# Patient Record
Sex: MALE | Race: WHITE | Employment: FULL TIME | URBAN - METROPOLITAN AREA
[De-identification: names, ages, dates, MRNs, and addresses within clinical notes are randomized per-mention and may not be internally consistent; named-entity substitution may affect disease eponyms.]

---

## 2018-02-26 ENCOUNTER — OFFICE VISIT (OUTPATIENT)
Dept: FAMILY MEDICINE CLINIC | Age: 23
End: 2018-02-26

## 2018-02-26 VITALS
HEART RATE: 83 BPM | BODY MASS INDEX: 22.26 KG/M2 | SYSTOLIC BLOOD PRESSURE: 122 MMHG | TEMPERATURE: 97.9 F | WEIGHT: 179 LBS | HEIGHT: 75 IN | RESPIRATION RATE: 20 BRPM | OXYGEN SATURATION: 97 % | DIASTOLIC BLOOD PRESSURE: 82 MMHG

## 2018-02-26 DIAGNOSIS — Z00.00 ROUTINE GENERAL MEDICAL EXAMINATION AT A HEALTH CARE FACILITY: Primary | ICD-10-CM

## 2018-02-26 RX ORDER — IBUPROFEN 800 MG/1
800 TABLET ORAL
COMMUNITY
End: 2021-10-04

## 2018-02-26 RX ORDER — CYCLOBENZAPRINE HCL 10 MG
TABLET ORAL
COMMUNITY
End: 2021-10-04

## 2018-02-26 NOTE — MR AVS SNAPSHOT
[address] [address] 
[phone] Patient: Dhaval Faith MRN: [id number] Visit Information Date & Time Provider Department Dept. Phone Encounter #  
 2/26/2018  9:10 AM Brendan Alex MD [address] [facility] [phone] [id number] Follow-up Instructions Return if symptoms worsen or fail to improve. Upcoming Health Maintenance Date Due  
 HPV AGE 9Y-34Y (1 of 3 - Male 3 Dose Series) 1/31/2008 Influenza Age 5 to Adult 8/1/2017 DTaP/Tdap/Td series (1 - Tdap) 1/31/2018 Allergies as of 2/26/2018  Review Complete On: 2/26/2018 By: Brendan Alex MD  
 No Known Allergies Current Immunizations  Never Reviewed No immunizations on file. Not reviewed this visit You Were Diagnosed With   
  
 Codes Comments Routine general medical examination at a health care facility    -  Primary ICD-10-CM: Z00.00 ICD-9-CM: V70.0 Vitals BP Pulse Temp Resp Height(growth percentile) Weight(growth percentile) 122/82 83 97.9 °F (36.6 °C) 20 6' 3\" (1.905 m) 179 lb (81.2 kg) SpO2 BMI Smoking Status 97% 22.37 kg/m2 Never Smoker Vitals History BMI and BSA Data Body Mass Index Body Surface Area  
 22.37 kg/m 2 2.07 m 2 Preferred Pharmacy Pharmacy Name Phone [facility] [address] [place] [address] [phone] Your Updated Medication List  
  
   
This list is accurate as of 2/26/18  9:11 AM.  Always use your most recent med list.  
  
  
  
  
 cyclobenzaprine 10 mg tablet Commonly known as:  FLEXERIL Take  by mouth three (3) times daily as needed for Muscle Spasm(s). ibuprofen 800 mg tablet Commonly known as:  MOTRIN Take 800 mg by mouth every eight (8) hours as needed for Pain. We Performed the Following CBC WITH AUTOMATED DIFF [13968 CPT(R)] LIPID PANEL [66547 CPT(R)] METABOLIC PANEL, COMPREHENSIVE [24107 CPT(R)] TSH 3RD GENERATION [90751 CPT(R)] Follow-up Instructions Return if symptoms worsen or fail to improve. Introducing [facility]! Karolina Franco introduces [employer] patient portal. Now you can access parts of your medical record, email your doctor's office, and request medication refills online. 1. In your internet browser, go to [email or web address] 2. Click on the First Time User? Click Here link in the Sign In box. You will see the New Member Sign Up page. 3. Enter your [employer] Access Code exactly as it appears below. You will not need to use this code after youve completed the sign-up process. If you do not sign up before the expiration date, you must request a new code. · [employer] Access Code: [email or web address] Expires: 5/27/2018  9:11 AM 
 
4. Enter the last four digits of your Social Security Number (xxxx) and Date of Birth (mm/dd/yyyy) as indicated and click Submit. You will be taken to the next sign-up page. 5. Create a [employer] ID. This will be your [employer] login ID and cannot be changed, so think of one that is secure and easy to remember. 6. Create a [employer] password. You can change your password at any time. 7. Enter your Password Reset Question and Answer. This can be used at a later time if you forget your password. 8. Enter your e-mail address. You will receive e-mail notification when new information is available in [address] 9. Click Sign Up. You can now view and download portions of your medical record. 10. Click the Download Summary menu link to download a portable copy of your medical information. If you have questions, please visit the Frequently Asked Questions section of the [employer] website. Remember, [employer] is NOT to be used for urgent needs. For medical emergencies, dial 911. Now available from your iPhone and Android! Please provide this summary of care documentation to your next provider. If you have any questions after today's visit, please call [phone].

## 2018-02-26 NOTE — PROGRESS NOTES
New patient here to establish care. Meds, allergies, and past medical history reviewed with patient. Family history of htn and diabetes. Patient has a 3 day right groin injury that is currently on flexeril and ibuprofen. Pain still 6/ 10. Chief Complaint   Patient presents with    [facility]     new patient    Groin Injury     3 days ago hurt right groin at work      he is a 26y.o. year old male who presents for evalution. Reviewed PmHx, RxHx, FmHx, SocHx, AllgHx and updated and dated in the chart. There are no active problems to display for this patient. Review of Systems - negative except as listed above in the HPI    Objective:     Vitals:    02/26/18 0851   BP: 122/82   Pulse: 83   Resp: 20   Temp: 97.9 °F (36.6 °C)   SpO2: 97%   Weight: 179 lb (81.2 kg)   Height: 6' 3\" (1.905 m)     Physical Examination: General appearance - alert, well appearing, and in no distress  Eyes - pupils equal and reactive, extraocular eye movements intact  Ears - bilateral TM's and external ear canals normal  Nose - normal and patent, no erythema, discharge or polyps  Mouth - mucous membranes moist, pharynx normal without lesions  Neck - supple, no significant adenopathy  Chest - clear to auscultation, no wheezes, rales or rhonchi, symmetric air entry  Heart - normal rate, regular rhythm, normal S1, S2, no murmurs, rubs, clicks or gallops  Abdomen - soft, nontender, nondistended, no masses or organomegaly  Extremities - peripheral pulses normal, no pedal edema, no clubbing or cyanosis    Assessment/ Plan:   Diagnoses and all orders for this visit:    1.  Routine general medical examination at a health care facility  -     LIPID PANEL  -     METABOLIC PANEL, COMPREHENSIVE  -     CBC WITH AUTOMATED DIFF  -     TSH 3RD GENERATION       -Patient is in good health  -Discussed with patient cancer risk factors and screens needed  -Colonoscopy was recommended based on current guidelines for screening.  -Labs from previous visits were discussed with patient yes  -Discussed with patient diet and exercise  -Immunizations appropriate for age were discussed with pt and updated  -Follow-up Disposition:  Return if symptoms worsen or fail to improve. I have discussed the diagnosis with the patient and the intended plan as seen in the above orders. The patient understands and agrees with the plan. The patient has received an after-visit summary and questions were answered concerning future plans. Medication Side Effects and Warnings were discussed with patient  Patient Labs were reviewed and or requested  Patient Past Records were reviewed and or requested     There are no Patient Instructions on file for this visit.         Jaycee Martinez M.D.

## 2018-02-27 LAB
ALBUMIN SERPL-MCNC: 4.6 G/DL (ref 3.5–5.5)
ALBUMIN/GLOB SERPL: 1.8 {RATIO} (ref 1.2–2.2)
ALP SERPL-CCNC: 49 IU/L (ref 39–117)
ALT SERPL-CCNC: 15 IU/L (ref 0–44)
AST SERPL-CCNC: 17 IU/L (ref 0–40)
BASOPHILS # BLD AUTO: 0 X10E3/UL (ref 0–0.2)
BASOPHILS NFR BLD AUTO: 1 %
BILIRUB SERPL-MCNC: 0.7 MG/DL (ref 0–1.2)
BUN SERPL-MCNC: 12 MG/DL (ref 6–20)
BUN/CREAT SERPL: 13 (ref 9–20)
CALCIUM SERPL-MCNC: 9.6 MG/DL (ref 8.7–10.2)
CHLORIDE SERPL-SCNC: 99 MMOL/L (ref 96–106)
CHOLEST SERPL-MCNC: 152 MG/DL (ref 100–199)
CO2 SERPL-SCNC: 29 MMOL/L (ref 18–29)
CREAT SERPL-MCNC: 0.92 MG/DL (ref 0.76–1.27)
EOSINOPHIL # BLD AUTO: 0.2 X10E3/UL (ref 0–0.4)
EOSINOPHIL NFR BLD AUTO: 5 %
ERYTHROCYTE [DISTWIDTH] IN BLOOD BY AUTOMATED COUNT: 12.9 % (ref 12.3–15.4)
GFR SERPLBLD CREATININE-BSD FMLA CKD-EPI: 118 ML/MIN/1.73
GFR SERPLBLD CREATININE-BSD FMLA CKD-EPI: 137 ML/MIN/1.73
GLOBULIN SER CALC-MCNC: 2.6 G/DL (ref 1.5–4.5)
GLUCOSE SERPL-MCNC: 86 MG/DL (ref 65–99)
HCT VFR BLD AUTO: 40.1 % (ref 37.5–51)
HDLC SERPL-MCNC: 48 MG/DL
HGB BLD-MCNC: 14.1 G/DL (ref 13–17.7)
IMM GRANULOCYTES # BLD: 0 X10E3/UL (ref 0–0.1)
IMM GRANULOCYTES NFR BLD: 0 %
INTERPRETATION, 910389: NORMAL
LDLC SERPL CALC-MCNC: 77 MG/DL (ref 0–99)
LYMPHOCYTES # BLD AUTO: 1.3 X10E3/UL (ref 0.7–3.1)
LYMPHOCYTES NFR BLD AUTO: 31 %
MCH RBC QN AUTO: 29.3 PG (ref 26.6–33)
MCHC RBC AUTO-ENTMCNC: 35.2 G/DL (ref 31.5–35.7)
MCV RBC AUTO: 83 FL (ref 79–97)
MONOCYTES # BLD AUTO: 0.3 X10E3/UL (ref 0.1–0.9)
MONOCYTES NFR BLD AUTO: 8 %
NEUTROPHILS # BLD AUTO: 2.4 X10E3/UL (ref 1.4–7)
NEUTROPHILS NFR BLD AUTO: 55 %
PLATELET # BLD AUTO: 159 X10E3/UL (ref 150–379)
POTASSIUM SERPL-SCNC: 3.8 MMOL/L (ref 3.5–5.2)
PROT SERPL-MCNC: 7.2 G/DL (ref 6–8.5)
RBC # BLD AUTO: 4.81 X10E6/UL (ref 4.14–5.8)
SODIUM SERPL-SCNC: 141 MMOL/L (ref 134–144)
TRIGL SERPL-MCNC: 133 MG/DL (ref 0–149)
TSH SERPL DL<=0.005 MIU/L-ACNC: 1.86 UIU/ML (ref 0.45–4.5)
VLDLC SERPL CALC-MCNC: 27 MG/DL (ref 5–40)
WBC # BLD AUTO: 4.2 X10E3/UL (ref 3.4–10.8)

## 2018-02-27 NOTE — PROGRESS NOTES
After reviewing your labs, I believe they are within normal  limits for your age and let us stay with our current plan of action. In addition, you may receive a [employer] from our office. Thank you in advance for filling this out for us, and if you cannot   give a 10 on our ratings, please reach out to us and let us know  what we can do better for you! We strive for a ten, and while we   may not be perfect 100% of the time, we always try our best for  our patients! Ashley Caballero M.D.   Good Help to Those in Need

## 2021-10-04 ENCOUNTER — OFFICE VISIT (OUTPATIENT)
Dept: FAMILY MEDICINE CLINIC | Age: 26
End: 2021-10-04
Payer: COMMERCIAL

## 2021-10-04 VITALS
HEIGHT: 75 IN | WEIGHT: 260 LBS | TEMPERATURE: 97.4 F | SYSTOLIC BLOOD PRESSURE: 118 MMHG | DIASTOLIC BLOOD PRESSURE: 82 MMHG | HEART RATE: 83 BPM | RESPIRATION RATE: 16 BRPM | BODY MASS INDEX: 32.33 KG/M2

## 2021-10-04 DIAGNOSIS — R40.0 DAYTIME SOMNOLENCE: Primary | ICD-10-CM

## 2021-10-04 DIAGNOSIS — R53.83 FATIGUE, UNSPECIFIED TYPE: ICD-10-CM

## 2021-10-04 DIAGNOSIS — R63.0 LOSS OF APPETITE: ICD-10-CM

## 2021-10-04 PROCEDURE — 99204 OFFICE O/P NEW MOD 45 MIN: CPT | Performed by: NURSE PRACTITIONER

## 2021-10-04 NOTE — PATIENT INSTRUCTIONS
Sleep Apnea: Care Instructions  Overview     Sleep apnea means that you frequently stop breathing for 10 seconds or longer during sleep. It can be mild to severe, based on the number of times an hour that you stop breathing. Blocked or narrowed airways in your nose, mouth, or throat can cause sleep apnea. Your airway can become blocked when your throat muscles and tongue relax during sleep. You can help treat sleep apnea at home by making lifestyle changes. You also can use a CPAP breathing machine that keeps tissues in the throat from blocking your airway. Or your doctor may suggest that you use a breathing device while you sleep. It helps keep your airway open. This could be a device that you put in your mouth. In some cases, surgery may be needed to remove enlarged tissues in the throat. Follow-up care is a key part of your treatment and safety. Be sure to make and go to all appointments, and call your doctor if you are having problems. It's also a good idea to know your test results and keep a list of the medicines you take. How can you care for yourself at home? · Lose weight, if needed. · Sleep on your side. It may help mild apnea. · Avoid alcohol and medicines such as sleeping pills, opioids, or sedatives before bed. · Don't smoke. If you need help quitting, talk to your doctor. · Prop up the head of your bed. · Treat breathing problems, such as a stuffy nose, that are caused by a cold or allergies. · Try a continuous positive airway pressure (CPAP) breathing machine if your doctor recommends it. · If CPAP doesn't work for you, ask your doctor if you can try other masks, settings, or breathing machines. · Try oral breathing devices or other nasal devices. · Talk to your doctor if your nose feels dry or bleeds, or if it gets runny or stuffy when you use a breathing machine. · Tell your doctor if you're sleepy during the day and it affects your daily life.  Don't drive or operate machinery when you're drowsy. When should you call for help? Watch closely for changes in your health, and be sure to contact your doctor if:    · You still have sleep apnea even though you have made lifestyle changes.     · You are thinking of trying a device such as CPAP.     · You are having problems using a CPAP or similar machine.     · You are still sleepy during the day, and it affects your daily life. Where can you learn more? Go to http://www.gray.com/  Enter J936 in the search box to learn more about \"Sleep Apnea: Care Instructions. \"  Current as of: July 6, 2021               Content Version: 13.0  © 4273-6484 [employer]. Care instructions adapted under license by [employer] (which disclaims liability or warranty for this information). If you have questions about a medical condition or this instruction, always ask your healthcare professional. [address] [address] any warranty or liability for your use of this information.

## 2021-10-04 NOTE — PROGRESS NOTES
1. Have you been to the ER, urgent care clinic since your last visit? Hospitalized since your last visit? Yes When: 2 weeks ago patient went to [facility] and a few days later he went to [facility] for dehyadration. Felt like he may have had a panic attack     2. Have you seen or consulted any other health care providers outside of the [address] [place] [address] since your last visit? Include any pap smears or colon screening.  Yes When: 2 weeks ago went to [facility].    Visit Vitals  /82 (BP 1 Location: Left upper arm, BP Patient Position: Sitting)   Pulse 83   Temp 97.4 °F (36.3 °C) (Axillary)   Resp 16   Ht 6' 3\" (1.905 m)   Wt 260 lb (117.9 kg)   BMI 32.50 kg/m²     Chief Complaint   Patient presents with    Anxiety    Fatigue

## 2021-10-04 NOTE — PROGRESS NOTES
He presents new evaluation of fatigue, daytime somnolence. On 22nd while at work, he doesn't normally work around people, his heart started racing being around people, had racing heart. Was seen at [facility] and [facility], on the 23rd [facility] neg [facility], he went for extreme lethargy. He [facility] 267lbs at [facility].  On Friday night, he went to [employer] and was advised he was dehydrated, went back on Saturday, did ekg, covid testing all normal, labs otherwise normal.  He lost 10 lbs from Thursday to Saturday. He hasn't been back to work since Wednesday the 22nd. The ER physician advised was probably a panic attack. He sleeps 8 hours a night, but wakes up feeling like he doesn't have any energy, and he feels he hasn't slept at all. He feels it in his eyes. This isn't normal for him, and he feels he can't get off the couch. He is eating less, and feels his stomach. 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 He complains of weight loss and fatigue. He denies current suicidal and homicidal plan or intent. Family history significant for anxiety. Possible organic causes contributing are: none. Risk factors: none apparent. Previous drug treatment includes none; other therapy: no other therapies. Current treatment includes none and none. Lives in an apt with his gf and his brother, they both were sick 2-3 days ago, they both were vomiting, today they are both feeling fine. He notes his fatigue is better today. Depression ROS:  He denies depressed mood, anhedonia, hypersomnia, psychomotor agitation, psychomotor retardation, feelings of worthlessness/guilt, impaired memory, recurrent thoughts of death, suicidal thoughts without plan and suicidal thoughts with specific plan. He experiences the following side effects from the treatment: none. Review of Systems   Constitutional: Positive for malaise/fatigue and weight loss. Negative for chills, diaphoresis and fever. Respiratory: Positive for cough (dry cough mostly resolved). Gastrointestinal: Negative for abdominal pain, heartburn, nausea and vomiting. Neurological: Positive for weakness. Negative for dizziness, tingling, tremors, sensory change, speech change, seizures, loss of consciousness and headaches. Psychiatric/Behavioral: Negative for depression, hallucinations, memory loss, substance abuse and suicidal ideas. The patient does not have insomnia. Objective  Visit Vitals  /82 (BP 1 Location: Left upper arm, BP Patient Position: Sitting)   Pulse 83   Temp 97.4 °F (36.3 °C) (Axillary)   Resp 16   Ht 6' 3\" (1.905 m)   Wt 260 lb (117.9 kg)   BMI 32.50 kg/m²     Physical Exam  Constitutional:       Appearance: Normal appearance. He is normal weight. HENT:      Head: Normocephalic and atraumatic. Eyes:      Extraocular Movements: Extraocular movements intact. Pupils: Pupils are equal, round, and reactive to light. Cardiovascular:      Rate and Rhythm: Normal rate and regular rhythm. Pulses: Normal pulses. Pulmonary:      Effort: Pulmonary effort is normal.   Musculoskeletal:         General: Normal range of motion. Cervical back: Normal range of motion. Neurological:      General: No focal deficit present. Mental Status: He is alert and oriented to person, place, and time. Cranial Nerves: Cranial nerves are intact. No cranial nerve deficit or facial asymmetry. Sensory: Sensation is intact. Motor: Motor function is intact. No weakness, tremor or pronator drift. Coordination: Coordination is intact. Romberg sign negative. Coordination normal. Finger-Nose-Finger Test and Heel to Kay Heir Test normal. Rapid alternating movements normal.      Gait: Gait is intact.  Gait and tandem walk normal.      Deep Tendon Reflexes: Reflexes normal.      Reflex Scores:       Patellar reflexes are 2+ on the right side and 2+ on the left side. Psychiatric:         Mood and Affect: Mood normal.         Behavior: Behavior normal.          Score of 2 KIMBERLY-7  Score of 9 PHQ     Assessment & Plan    ICD-10-CM ICD-9-CM    1. Daytime somnolence  R40.0 780.54 REFERRAL TO SLEEP STUDIES   2. Fatigue, unspecified type  R53.83 780.79 REFERRAL TO SLEEP STUDIES   3. Loss of appetite  R63.0 783.0      Diagnoses and all orders for this visit:    1. Daytime somnolence  -     REFERRAL TO SLEEP STUDIES    2. Fatigue, unspecified type  -     REFERRAL TO SLEEP STUDIES    3. 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 But doesn't feel this issues relates to sleep or depression, and I am no inclined to treat for depression due to acute presentation. Working diagnosis is post viral, advised watchful waiting and a f/u, schedule sleep study can cancel if symptoms resolve. Requested labs from [facility] to r/o YAKELIN. Follow-up and Dispositions    · Return in about 6 weeks (around 11/15/2021) for joce.        Tod Morton NP

## 2021-10-14 ENCOUNTER — TELEPHONE (OUTPATIENT)
Dept: SLEEP MEDICINE | Age: 26
End: 2021-10-14

## 2021-11-24 ENCOUNTER — OFFICE VISIT (OUTPATIENT)
Dept: FAMILY MEDICINE CLINIC | Age: 26
End: 2021-11-24
Payer: COMMERCIAL

## 2021-11-24 VITALS
WEIGHT: 260 LBS | HEIGHT: 75 IN | BODY MASS INDEX: 32.33 KG/M2 | RESPIRATION RATE: 14 BRPM | DIASTOLIC BLOOD PRESSURE: 86 MMHG | TEMPERATURE: 98 F | SYSTOLIC BLOOD PRESSURE: 131 MMHG | HEART RATE: 79 BPM | OXYGEN SATURATION: 98 %

## 2021-11-24 DIAGNOSIS — Z28.21 TETANUS, DIPHTHERIA, AND ACELLULAR PERTUSSIS (TDAP) VACCINATION DECLINED: ICD-10-CM

## 2021-11-24 DIAGNOSIS — Z00.00 WELLNESS EXAMINATION: Primary | ICD-10-CM

## 2021-11-24 DIAGNOSIS — Z11.59 ENCOUNTER FOR HEPATITIS C SCREENING TEST FOR LOW RISK PATIENT: ICD-10-CM

## 2021-11-24 PROCEDURE — 99395 PREV VISIT EST AGE 18-39: CPT | Performed by: NURSE PRACTITIONER

## 2021-11-24 NOTE — PROGRESS NOTES
Previous HPI:  He presents new evaluation of fatigue, daytime somnolence. On 22nd while at work, he doesn't normally work around people, his heart started racing being around people, had racing heart. Was seen at [facility], on the 23rd pt went [facility] had neg covid test, he went for extreme lethargy. He was 267lbs at [facility].  On Friday night, he went to [employer] and was advised he was dehydrated, went back on Saturday, did ekg, covid testing all normal, labs otherwise normal.  He lost 10 lbs from Thursday to Saturday. He hasn't been back to work since Wednesday the 22nd. The ER physician advised was probably a panic attack. He sleeps 8 hours a night, but wakes up feeling like he doesn't have any energy, and he feels he hasn't slept at all. He feels it in his eyes. This isn't normal for him, and he feels he can't get off the couch. He is eating less, and feels his stomach. 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 He complains of weight loss and fatigue. He denies current suicidal and homicidal plan or intent. Family history significant for anxiety. Possible organic causes contributing are: none. Risk factors: none apparent. Previous drug treatment includes none; other therapy: no other therapies. Current treatment includes none and none. Lives in an apt with his gf and his brother, they both were sick 2-3 days ago, they both were vomiting, today they are both feeling fine. He notes his fatigue is better today. Update 11/24/21:  Tyrell Duong is on the schedule today for annual wellness exam and may also be due for follow-up of chronic/acute issues.  Pt is willing to do both appointments today and realizes that there may be a co-pay for the follow-up portion of the visit. For the wellness:  Flu vaccine- Recommended every fall  COVID vaccine series- he is contemplating  Tetanus- Tdap   Shingrix- series not completed  Pneumovax 23- N/A  Prevnar 13- at age 74  HCV screening- complete  PSA-na  Colon cancer screening- na  AAA screening- N/A  Low dose CT scan- N/A  PHQ2 Score = 0  Occupation - [employer]   Exercise- physically active at work, runs around the house with the baby. Doesn't have the drive to go to a gym because so tired after working  Smoking status- never  ETOH - occasionally, liquor,maybe 2 drinks, 1-2x a week. Diet - \"the most unhealthy it can possibly be\". occ fruits and veg. He wants to buy a juicer and get that going. But he plans to start eating healthier. He eats fast food everyday for lunch, usually a sandwich. In 2018 he was really skinny. Then he started making money and bulking up at his job. His daughter is now 6 months old  Past Medical History:   Diagnosis Date    Osgood-Schlatter's disease     bilat knees      No current outpatient medications   For the acute/chronic issues:  Fatigue is improving. Didn't go for sleep study  Review of Systems   All other systems reviewed and are negative. Vitals:    11/24/21 0758   BP: 131/86   Pulse: 79   Resp: 14   Temp: 98 °F (36.7 °C)   TempSrc: Axillary   SpO2: 98%   Weight: 260 lb (117.9 kg)   Height: 6' 3\" (1.905 m)      Physical Exam  Constitutional:       Appearance: Normal appearance. HENT:      Head: Normocephalic and atraumatic. Right Ear: External ear normal.      Left Ear: External ear normal.      Nose: Nose normal.   Eyes:      Extraocular Movements: Extraocular movements intact. Conjunctiva/sclera: Conjunctivae normal.      Pupils: Pupils are equal, round, and reactive to light. Cardiovascular:      Rate and Rhythm: Normal rate and regular rhythm. Pulses: Normal pulses. Heart sounds: Normal heart sounds.    Pulmonary: Effort: Pulmonary effort is normal.      Breath sounds: Normal breath sounds. Abdominal:      General: Bowel sounds are normal.      Palpations: Abdomen is soft. Musculoskeletal:         General: Normal range of motion. Cervical back: Normal range of motion and neck supple. Skin:     General: Skin is warm and dry. Neurological:      General: No focal deficit present. Mental Status: He is alert and oriented to person, place, and time. Psychiatric:         Mood and Affect: Mood normal.         Behavior: Behavior normal.         Thought Content: Thought content normal.         Judgment: Judgment normal.         KIMBERLY 2/7 11/24/2021   Feeling nervous, anxious, or on edge 3   Not being able to stop or control worrying 1   Worrying too much about different things 0   Trouble relaxing 0   Being so restless that it is hard to sit still 0   Becoming easily annoyed or irritable 1   Feeling afraid as if something awful might happen 0   KIMBERLY-7 Total Score 5        Lab Results   Component Value Date/Time    WBC 4.2 02/26/2018 09:12 AM    HGB 14.1 02/26/2018 09:12 AM    HCT 40.1 02/26/2018 09:12 AM    PLATELET 022 82/49/6599 09:12 AM    MCV 83 02/26/2018 09:12 AM     Lab Results   Component Value Date/Time    Glucose 86 02/26/2018 09:12 AM    LDL, calculated 77 02/26/2018 09:12 AM    Creatinine 0.92 02/26/2018 09:12 AM      Lab Results   Component Value Date/Time    Sodium 141 02/26/2018 09:12 AM    Potassium 3.8 02/26/2018 09:12 AM    Chloride 99 02/26/2018 09:12 AM    CO2 29 02/26/2018 09:12 AM    Glucose 86 02/26/2018 09:12 AM    BUN 12 02/26/2018 09:12 AM    Creatinine 0.92 02/26/2018 09:12 AM    BUN/Creatinine ratio 13 02/26/2018 09:12 AM    GFR est  02/26/2018 09:12 AM    GFR est non- 02/26/2018 09:12 AM    Calcium 9.6 02/26/2018 09:12 AM    Bilirubin, total 0.7 02/26/2018 09:12 AM    ALT (SGPT) 15 02/26/2018 09:12 AM    Alk.  phosphatase 49 02/26/2018 09:12 AM    Protein, total 7.2 02/26/2018 09:12 AM    Albumin 4.6 02/26/2018 09:12 AM    A-G Ratio 1.8 02/26/2018 09:12 AM          Diagnoses and all orders for this visit:    1. Wellness examination  -     LIPID PANEL  -     METABOLIC PANEL, COMPREHENSIVE  -     CBC WITH AUTOMATED DIFF  At today's annual wellness visit, the patient and I reviewed. - Vital signs: to include temperature, blood pressure, heart rate, and respiration rate create a baseline for overall wellness. - Current vaccine status: Depending on age, recommendations were made for new vaccine or a booster shot.  - Recommended screenings: to include blood test, colonoscopy or bone density screening as appropriate for the pts age and gender  - We discussed new prevention and treatment recommendations, and any new health changes. - Provided guidance on reducing risk for disease. As applicable provided motivation to stop smoking, lose unhealthy pounds, or manage risk factors like high blood pressure.  - Reviewed medications. To include prescriptions, over-the-counter drugs, and supplements; and reviewed risk for interactions. 2. BMI 32.0-32.9,adult  Encouraged/praised patient weight loss efforts, encouraged healthy weight loss of one to 2 pounds a week, continue exercise, continue low-carb, a diet high in fresh vegetables and fruits low in processed/fast foods, hydrate with water and avoid sugary beverages. Additionally, filter tap water with tap attachment or water pitcher, avoid using plastic bottles, and not to heat food in plastic/Styrofoam containers. Avoid unnecessary antibiotic prescriptions, noting abx use is correlated with wt gain; and used in livestock for weight gain. Encouraged patient to increase fiber intake, can supplement with over-the-counter psyllium fiber. Pt declines pharmacological intervention at this time, advised to follow up with PCP should change mind  3. Tetanus, diphtheria, and acellular pertussis (Tdap) vaccination declined    4.  Encounter for hepatitis C screening test for low risk patient  -     HCV AB W/RFLX TO EDDIE  For adults aged 25 to 78 years: Grade B to Screen adults for hepatitis C virus (HCV) infection. Asymptomatic adults (including pregnant persons) aged 25 to 78 years.

## 2021-11-24 NOTE — PROGRESS NOTES
Chief Complaint   Patient presents with    Follow-up     Visit Vitals  /86 (BP 1 Location: Left upper arm, BP Patient Position: Sitting)   Pulse 79   Temp 98 °F (36.7 °C) (Axillary)   Resp 14   Ht 6' 3\" (1.905 m)   Wt 260 lb (117.9 kg)   SpO2 98%   BMI 32.50 kg/m²     1. Have you been to the ER, urgent care clinic since your last visit? Hospitalized since your last visit? No    2. Have you seen or consulted any other health care providers outside of the [address] [place] [address] since your last visit? Include any pap smears or colon screening.  No

## 2021-11-25 LAB
ALBUMIN SERPL-MCNC: 5 G/DL (ref 4.1–5.2)
ALBUMIN/GLOB SERPL: 2.2 {RATIO} (ref 1.2–2.2)
ALP SERPL-CCNC: 66 IU/L (ref 44–121)
ALT SERPL-CCNC: 24 IU/L (ref 0–44)
AST SERPL-CCNC: 17 IU/L (ref 0–40)
BASOPHILS # BLD AUTO: 0.1 X10E3/UL (ref 0–0.2)
BASOPHILS NFR BLD AUTO: 1 %
BILIRUB SERPL-MCNC: 0.9 MG/DL (ref 0–1.2)
BUN SERPL-MCNC: 13 MG/DL (ref 6–20)
BUN/CREAT SERPL: 13 (ref 9–20)
CALCIUM SERPL-MCNC: 10 MG/DL (ref 8.7–10.2)
CHLORIDE SERPL-SCNC: 101 MMOL/L (ref 96–106)
CHOLEST SERPL-MCNC: 193 MG/DL (ref 100–199)
CO2 SERPL-SCNC: 28 MMOL/L (ref 20–29)
CREAT SERPL-MCNC: 1 MG/DL (ref 0.76–1.27)
EOSINOPHIL # BLD AUTO: 0.3 X10E3/UL (ref 0–0.4)
EOSINOPHIL NFR BLD AUTO: 6 %
ERYTHROCYTE [DISTWIDTH] IN BLOOD BY AUTOMATED COUNT: 11.6 % (ref 11.6–15.4)
GLOBULIN SER CALC-MCNC: 2.3 G/DL (ref 1.5–4.5)
GLUCOSE SERPL-MCNC: 96 MG/DL (ref 65–99)
HCT VFR BLD AUTO: 44.9 % (ref 37.5–51)
HCV AB S/CO SERPL IA: <0.1 S/CO RATIO (ref 0–0.9)
HCV AB SERPL QL IA: NORMAL
HDLC SERPL-MCNC: 44 MG/DL
HGB BLD-MCNC: 15.3 G/DL (ref 13–17.7)
IMM GRANULOCYTES # BLD AUTO: 0 X10E3/UL (ref 0–0.1)
IMM GRANULOCYTES NFR BLD AUTO: 0 %
LDLC SERPL CALC-MCNC: 126 MG/DL (ref 0–99)
LYMPHOCYTES # BLD AUTO: 1.7 X10E3/UL (ref 0.7–3.1)
LYMPHOCYTES NFR BLD AUTO: 29 %
MCH RBC QN AUTO: 29.5 PG (ref 26.6–33)
MCHC RBC AUTO-ENTMCNC: 34.1 G/DL (ref 31.5–35.7)
MCV RBC AUTO: 87 FL (ref 79–97)
MONOCYTES # BLD AUTO: 0.5 X10E3/UL (ref 0.1–0.9)
MONOCYTES NFR BLD AUTO: 9 %
NEUTROPHILS # BLD AUTO: 3.3 X10E3/UL (ref 1.4–7)
NEUTROPHILS NFR BLD AUTO: 55 %
PLATELET # BLD AUTO: 213 X10E3/UL (ref 150–450)
POTASSIUM SERPL-SCNC: 4.4 MMOL/L (ref 3.5–5.2)
PROT SERPL-MCNC: 7.3 G/DL (ref 6–8.5)
RBC # BLD AUTO: 5.18 X10E6/UL (ref 4.14–5.8)
SODIUM SERPL-SCNC: 142 MMOL/L (ref 134–144)
TRIGL SERPL-MCNC: 126 MG/DL (ref 0–149)
VLDLC SERPL CALC-MCNC: 23 MG/DL (ref 5–40)
WBC # BLD AUTO: 6 X10E3/UL (ref 3.4–10.8)

## 2021-11-26 NOTE — PROGRESS NOTES
lab tests show LDL cholesterol also known as the bad cholesterol is elevated. Often times elevated cholesterol is genetic but there are simple ways to counteract elevated cholesterol. In lieu of or in addition to a cholesterol-lowering medication, ways to naturally reduce your cholesterol and heart attack/stroke risk; include weight loss, increased physical activity (walking, stationary bike, rowing), if you smoke-quitting, if you are a diabetic controlling your blood sugar, increasing your intake of fiber and/or supplementing with psyllium fiber (Metamucil or generic product), eating nuts, (particularly walnuts, almonds, pistachios, macadamia nuts, pecans, and hazelnuts), whey protein, and whole grains.      Other effective Lifestyle Changes:  ·Mediterranean diet  ·Dietary Approaches to Stop Hypertension (DASH) diet  ·Vegetarian (or other meat restricted) diet  ·Low-carbohydrate diet  ·Avoidance of trans fats

## 2022-02-23 ENCOUNTER — TELEPHONE (OUTPATIENT)
Dept: INTERNAL MEDICINE CLINIC | Age: 27
End: 2022-02-23

## 2022-02-23 NOTE — TELEPHONE ENCOUNTER
----- Message from Rosalinda Booker sent at 2/23/2022  7:08 AM EST -----  Subject: Appointment Request    Reason for Call: Semi-Routine Cough, Cold Symptoms    QUESTIONS  Type of Appointment? Established Patient  Reason for appointment request? No appointments available during search  Additional Information for Provider? Pt called to schedule a appointment   for a possible sinus infection. Please return his call to schedule.   ---------------------------------------------------------------------------  --------------  CALL BACK INFO  What is the best way for the office to contact you? OK to leave message on   voicemail  Preferred Call Back Phone Number? [phone]  ---------------------------------------------------------------------------  --------------  SCRIPT ANSWERS  Relationship to Patient? Self  Are you currently unable to finish sentences due to any difficulty   breathing? No  Are you unable to swallow liquids? No  Are you having fevers (100.4 or greater), chills, or sweats? No  Do you have COPD, asthma or a chronic lung condition? No  Have your symptoms been present for more than 5 days? No  Have you recently (14 days) been seen by a provider for this issue? No  Have you been diagnosed with, awaiting test results for, or told that you   are suspected of having COVID-19 (Coronavirus)? (If patient has tested   negative or was tested as a requirement for work, school, or travel and   not based on symptoms, answer no)? Yes  Did your symptoms begin within the past 10 days or was your positive test   result within the past 10 days? No  Within the past 10 days have you developed any of the following symptoms   (answer no if symptoms have been present longer than 10 days or began   more than 10 days ago)?  Fever or Chills, Cough, Shortness of breath or   difficulty breathing, Loss of taste or smell, Sore throat, Nasal   congestion, Sneezing or runny nose, Fatigue or generalized body aches   (answer no if pain is specific to a body part e.g. back pain), Diarrhea,   Headache? No  Have you had close contact with someone with COVID-19 in the last 7 days? No  (Service Expert  click yes below to proceed with [place] As Usual   Scheduling)?  Yes

## 2023-03-17 ENCOUNTER — TELEPHONE (OUTPATIENT)
Dept: FAMILY MEDICINE CLINIC | Age: 28
End: 2023-03-17

## 2023-03-17 NOTE — TELEPHONE ENCOUNTER
Find out how much Imodium he is taking? If he continues having diarrhea get increase how much Imodium he is taking.

## 2023-03-17 NOTE — TELEPHONE ENCOUNTER
Pt stated that he has been having nausea and diarrhea for the past 3 days. Wants to know what else he can take because the Pepto and imodium are not working.

## 2023-03-17 NOTE — TELEPHONE ENCOUNTER
Patient would like to know what can he do for a stomach bug that is going around he has taken [address], Immodium AD and he has stomach cramps as well please call pateint back at [phone]

## 2023-03-17 NOTE — TELEPHONE ENCOUNTER
Patient has only taken 3 doses of imodium, reviewed directions of imodium which he is going to start doing.

## 2023-04-15 PROBLEM — E78.00 PURE HYPERCHOLESTEROLEMIA: Status: ACTIVE | Noted: 2023-04-15

## 2024-01-27 NOTE — PATIENT INSTRUCTIONS
Consults    Reason For Consult  Medical management    History Of Present Illness  Irish Davis is a 82 y.o. female with PMHx s/f HTN, HLD, moderate aortic stenosis, chronic bilateral lower extremity edema (on as needed Lasix), COPD with chronic hypoxic respiratory failure (baseline 3-4 L nasal cannula) who presented to the emergency room after a fall at home with bilateral distal radius fracture.  On admission in the emergency room she was hemodynamically stable with a blood pressure of 163/83 mmHg, heart rate of 72/min, respirate rate of 18/min, temperature of 36.8 °C and was saturating 94% on her baseline 4 L nasal cannula oxygen.  Initial CBC showed a hemoglobin of 10.6 BMP were significant for a potassium of 3.4.  Influenza antigen and COVID PCR were negative.  CT of the head was negative for any acute intracranial process, CT cervical spine showed loss of normal cervical lordosis with demineralization in addition to some healing or healed fracture of the C1 vertebrae.  X-ray of the bilateral wrist and hand showed complex distal radius and ulna fracture with impaction and dorsal angulation and displacement on the left and dorsally angulated distal radius fracture and ulnar styloid fracture on the right.  General medicine has been consulted for medical management.  I saw patient on the regular nursing floor..  She was drowsy and barely arousable.  Daughter and  at bedside confirming initial  history prior to admission.  Past Medical History  She has a past medical history of COPD (chronic obstructive pulmonary disease) (CMS/[facility]), Disorder of the skin and subcutaneous tissue, unspecified (07/22/2016), Personal history of other diseases of the respiratory system (02/06/2018), and Personal history of other specified conditions (07/22/2016).    Surgical History  She has a past surgical history that includes Other surgical history (09/25/2019).     Social History  She reports that she has quit smoking.  Well Visit, Ages 25 to 48: Care Instructions  Overview     Well visits can help you stay healthy. Your doctor has checked your overall health and may have suggested ways to take good care of yourself. Your doctor also may have recommended tests. At home, you can help prevent illness with healthy eating, regular exercise, and other steps. Follow-up care is a key part of your treatment and safety. Be sure to make and go to all appointments, and call your doctor if you are having problems. It's also a good idea to know your test results and keep a list of the medicines you take. How can you care for yourself at home? · Get screening tests that you and your doctor decide on. Screening helps find diseases before any symptoms appear. · Eat healthy foods. Choose fruits, vegetables, whole grains, protein, and low-fat dairy foods. Limit fat, especially saturated fat. Reduce salt in your diet. · Limit alcohol. If you are a man, have no more than 2 drinks a day or 14 drinks a week. If you are a woman, have no more than 1 drink a day or 7 drinks a week. · Get at least 30 minutes of physical activity on most days of the week. Walking is a good choice. You also may want to do other activities, such as running, swimming, cycling, or playing tennis or team sports. Discuss any changes in your exercise program with your doctor. · Reach and stay at a healthy weight. This will lower your risk for many problems, such as obesity, diabetes, heart disease, and high blood pressure. · Do not smoke or allow others to smoke around you. If you need help quitting, talk to your doctor about stop-smoking programs and medicines. These can increase your chances of quitting for good. · Care for your mental health. It is easy to get weighed down by worry and stress. Learn strategies to manage stress, like deep breathing and mindfulness, and stay connected with your family and community.  If you find you often feel sad or hopeless, talk Her smoking use included cigarettes. She does not have any smokeless tobacco history on file. She reports that she does not drink alcohol and does not use drugs.    Family History  No family history on file.     Allergies  Erythromycin    Review of Systems    Unable to complete review of systems due to patient's drowsiness  Physical Exam  Constitutional:       General: Acute on chronically ill looking elderly  female; dry mucous membrane, drowsy and barely arousable afebrile anicteric mildly pale       HENT:      Head: Normocephalic and atraumatic.      Right Ear: External ear normal.      Left Ear: External ear normal.      Nose: Nose normal.      Mouth/Throat:      Mouth: Mucous membranes are dry     Pharynx: Oropharynx is clear.   Eyes:      Extraocular Movements: Extraocular movements intact.      Conjunctiva/sclera: Conjunctivae normal.      Pupils: Pupils are equal, round, and reactive to light.   Cardiovascular:      Rate and Rhythm: Normal rate and regular rhythm.      Pulses: Normal pulses.      Heart sounds: Normal heart sounds.   Pulmonary:      Effort: Pulmonary effort is normal. No respiratory distress.      Breath sounds: Wheezing and rales present. No rhonchi.      Comments: NC in place  Abdominal:      General: Abdomen is flat. Bowel sounds are normal.      Palpations: Abdomen is soft.      Tenderness: There is no abdominal tenderness. There is no right CVA tenderness, left CVA tenderness, guarding or rebound.   Musculoskeletal:      Bilateral ACE bandaging of the upper extremity with splinting  Skin:     General: Skin is warm and dry.      Capillary Refill: Capillary refill takes less than 2 seconds.      Findings: No lesion or rash.   Neurological:      General: No focal deficit present.      Mental Status: She is drowsy and barely arousable,     Last Recorded Vitals  /65   Pulse 63   Temp 36.7 °C (98 °F) (Tympanic)   Resp 17   Wt 54.4 kg (120 lb)   SpO2 98%     Relevant  with your doctor. Treatment can help. · Talk to your doctor about whether you have any risk factors for sexually transmitted infections (STIs). You can help prevent STIs if you wait to have sex with a new partner (or partners) until you've each been tested for STIs. It also helps if you use condoms (male or female condoms) and if you limit your sex partners to one person who only has sex with you. Vaccines are available for some STIs, such as HPV. · Use birth control if it's important to you to prevent pregnancy. Talk with your doctor about the choices available and what might be best for you. · If you think you may have a problem with alcohol or drug use, talk to your doctor. This includes prescription medicines (such as amphetamines and opioids) and illegal drugs (such as cocaine and methamphetamine). Your doctor can help you figure out what type of treatment is best for you. · Protect your skin from too much sun. When you're outdoors from 10 a.m. to 4 p.m., stay in the shade or cover up with clothing and a hat with a wide brim. Wear sunglasses that block UV rays. Even when it's cloudy, put broad-spectrum sunscreen (SPF 30 or higher) on any exposed skin. · See a dentist one or two times a year for checkups and to have your teeth cleaned. · Wear a seat belt in the car. When should you call for help? Watch closely for changes in your health, and be sure to contact your doctor if you have any problems or symptoms that concern you. Where can you learn more? Go to http://www.[employer].com/  Enter P072 in the search box to learn more about \"Well Visit, Ages 25 to 48: Care Instructions. \"  Current as of: February 11, 2021               Content Version: 13.0  © 9873-3567 [employer]. Care instructions adapted under license by [employer] (which disclaims liability or warranty for this information).  If you have questions about a medical condition or this instruction, Results  XR hand right 3+ views    Result Date: 1/26/2024  Interpreted By:  Festus Schumacher, STUDY: XR HAND RIGHT 3+ VIEWS; XR WRIST RIGHT 3+ VIEWS; ;  1/26/2024 9:10 pm   INDICATION: Signs/Symptoms:fall.   COMPARISON: None.   ACCESSION NUMBER(S): [id number]; [id number]   ORDERING CLINICIAN: FESTUS BERMEO   FINDINGS: Evaluation of the right hand and wrist. No evidence of metacarpal fracture.   Dorsally angulated distal radius fracture and probable ulnar styloid fracture. Fracture is less displaced than on the left. The distal radius is angulated dorsally proximally 50 degrees. Resultant ulnar positive variance.         Dorsally angulated and mildly displaced distal radius fracture. Ulnar positive variance. Minimal displaced ulnar styloid fracture.   MACRO: None   Signed by: Festus Schumacher 1/26/2024 9:58 PM Dictation workstation:   [id number]    XR wrist right 3+ views    Result Date: 1/26/2024  Interpreted By:  Festus Schumacher, STUDY: XR HAND RIGHT 3+ VIEWS; XR WRIST RIGHT 3+ VIEWS; ;  1/26/2024 9:10 pm   INDICATION: Signs/Symptoms:fall.   COMPARISON: None.   ACCESSION NUMBER(S): [id number]; [id number]   ORDERING CLINICIAN: FESTUS BERMEO   FINDINGS: Evaluation of the right hand and wrist. No evidence of metacarpal fracture.   Dorsally angulated distal radius fracture and probable ulnar styloid fracture. Fracture is less displaced than on the left. The distal radius is angulated dorsally proximally 50 degrees. Resultant ulnar positive variance.         Dorsally angulated and mildly displaced distal radius fracture. Ulnar positive variance. Minimal displaced ulnar styloid fracture.   MACRO: None   Signed by: Festus Schumacher 1/26/2024 9:58 PM Dictation workstation:   [id number]    XR wrist left 3+ views    Result Date: 1/26/2024  Interpreted By:  Festus Schumacher, STUDY: XR HAND LEFT 3+ VIEWS; XR WRIST LEFT 3+ VIEWS; ;  1/26/2024 9:10 pm   INDICATION: Signs/Symptoms:fall.   COMPARISON: None.   ACCESSION  always ask your healthcare professional. [place] [address] any warranty or liability for your use of this information. NUMBER(S): [id number]; [id number]   ORDERING CLINICIAN: FESTUS BERMEO   FINDINGS: Left hand and wrist   Marked demineralization. Comminuted dorsally angulated and displaced distal radius fracture. Displacement approximately 4 mm as well as dorsal angulation. Distal ulnar shaft fracture noted with resultant ulnar positive variance.   No definite metacarpal fracture is seen. Severe base of thumb osteoarthritis       Complex distal radius and ulnar fracture with impaction, dorsal angulation and displacement   No definite hand fracture. Demineralization does limit sensitivity.   MACRO: None   Signed by: Festus Schumacher 1/26/2024 9:56 PM Dictation workstation:   [id number]    XR hand left 3+ views    Result Date: 1/26/2024  Interpreted By:  Festus Schumacher, STUDY: XR HAND LEFT 3+ VIEWS; XR WRIST LEFT 3+ VIEWS; ;  1/26/2024 9:10 pm   INDICATION: Signs/Symptoms:fall.   COMPARISON: None.   ACCESSION NUMBER(S): [id number]; [id number]   ORDERING CLINICIAN: FESTUS BERMEO   FINDINGS: Left hand and wrist   Marked demineralization. Comminuted dorsally angulated and displaced distal radius fracture. Displacement approximately 4 mm as well as dorsal angulation. Distal ulnar shaft fracture noted with resultant ulnar positive variance.   No definite metacarpal fracture is seen. Severe base of thumb osteoarthritis       Complex distal radius and ulnar fracture with impaction, dorsal angulation and displacement   No definite hand fracture. Demineralization does limit sensitivity.   MACRO: None   Signed by: Festus Schumacher 1/26/2024 9:56 PM Dictation workstation:   [id number]    CT head wo IV contrast    Result Date: 1/26/2024  Interpreted By:  Festus Schumacher, STUDY: CT HEAD WO IV CONTRAST; CT CERVICAL SPINE WO IV CONTRAST;  1/26/2024 9:22 pm   INDICATION: Signs/Symptoms:fall   COMPARISON: None.   ACCESSION NUMBER(S): [id number]; [id number]   ORDERING CLINICIAN: FESTUS BERMEO   TECHNIQUE: Axial noncontrast CT images of  head with coronal and sagittal reconstructed images. Axial noncontrast CT images of the cervical spine with coronal and sagittal reconstructed images.   FINDINGS: CT HEAD:   BRAIN PARENCHYMA:  No evidence of acute intraparenchymal hemorrhage or parenchymal evidence of acute large territory ischemic infarct. No mass-effect, midline shift or effacement of cerebral sulci. Gray-white matter distinction is preserved. Global volume loss and chronic small vessel ischemic change   VENTRICLES and EXTRA-AXIAL SPACES:  No acute extra-axial or intraventricular hemorrhage. Ventricles and sulci are age-concordant.   PARANASAL SINUSES/MASTOIDS:  No hemorrhage or air-fluid levels within the visualized paranasal sinuses. The mastoid air cells are well-aerated.   CALVARIUM/ORBITS:  No skull fracture.  The orbits and globes are intact to the extent visualized.   EXTRACRANIAL SOFT TISSUES: No discernible abnormality.     CT CERVICAL SPINE:   Straightening of the cervical spine detected. There is demineralization of the bones. Loss of normal cervical lordosis. Robust vascular calcification. Multilevel foraminal stenosis.   There is increased distance between the anterior arch of C1 and dens. There is a lucency across the body of the dens. I suspect that this is due to old injury as there is no definitive fracture line seen.. If there is focal pain in the region, it may be reasonable to evaluate from marrow edema with obtaining an MRI. Severe vascular calcification   Emphysema:       Global volume loss and chronic small vessel ischemic change. No evidence of acute intracranial hemorrhage.   Loss of normal cervical lordosis. Demineralization. Increased dens distance between the anterior arch of C1 measuring 3 mm. I do not see a definite acute fracture. I suspect that this may be due to old injury as there is suggestion of some healing or healed fracture. If there is focal pain in the region, further evaluation is advised with MRI to  evaluate for marrow edema.   Robust vascular calcification   Signed by: Festus Schumacher 1/26/2024 9:54 PM Dictation workstation:   [id number]    CT cervical spine wo IV contrast    Result Date: 1/26/2024  Interpreted By:  Fsetus Schumacher, STUDY: CT HEAD WO IV CONTRAST; CT CERVICAL SPINE WO IV CONTRAST;  1/26/2024 9:22 pm   INDICATION: Signs/Symptoms:fall   COMPARISON: None.   ACCESSION NUMBER(S): [id number]; [id number]   ORDERING CLINICIAN: FESTUS BERMEO   TECHNIQUE: Axial noncontrast CT images of head with coronal and sagittal reconstructed images. Axial noncontrast CT images of the cervical spine with coronal and sagittal reconstructed images.   FINDINGS: CT HEAD:   BRAIN PARENCHYMA:  No evidence of acute intraparenchymal hemorrhage or parenchymal evidence of acute large territory ischemic infarct. No mass-effect, midline shift or effacement of cerebral sulci. Gray-white matter distinction is preserved. Global volume loss and chronic small vessel ischemic change   VENTRICLES and EXTRA-AXIAL SPACES:  No acute extra-axial or intraventricular hemorrhage. Ventricles and sulci are age-concordant.   PARANASAL SINUSES/MASTOIDS:  No hemorrhage or air-fluid levels within the visualized paranasal sinuses. The mastoid air cells are well-aerated.   CALVARIUM/ORBITS:  No skull fracture.  The orbits and globes are intact to the extent visualized.   EXTRACRANIAL SOFT TISSUES: No discernible abnormality.     CT CERVICAL SPINE:   Straightening of the cervical spine detected. There is demineralization of the bones. Loss of normal cervical lordosis. Robust vascular calcification. Multilevel foraminal stenosis.   There is increased distance between the anterior arch of C1 and dens. There is a lucency across the body of the dens. I suspect that this is due to old injury as there is no definitive fracture line seen.. If there is focal pain in the region, it may be reasonable to evaluate from marrow edema with obtaining an MRI.  Severe vascular calcification   Emphysema:       Global volume loss and chronic small vessel ischemic change. No evidence of acute intracranial hemorrhage.   Loss of normal cervical lordosis. Demineralization. Increased dens distance between the anterior arch of C1 measuring 3 mm. I do not see a definite acute fracture. I suspect that this may be due to old injury as there is suggestion of some healing or healed fracture. If there is focal pain in the region, further evaluation is advised with MRI to evaluate for marrow edema.   Robust vascular calcification   Signed by: Sebastián Schumacher 1/26/2024 9:54 PM Dictation workstation:   [id number]        Assessment/Plan     #Bilateral distal radius and ulna fracture  Admitted on regular nursing floor  Started on pain control  Orthopedic surgery consulted for surgical intervention    #Chronic respiratory failure  Will resume nasal cannula oxygen    #COPD  Currently stable  As needed DuoNebs    #Hypertension  Resumed metoprolol    #DVT prophylaxis  I will defer to primary team however consider holding anticoagulation in view of patient's cephalhematoma        Spent 50 minutes in the initial consult on this patient    Ortiz Marshall MD

## 2024-03-30 ENCOUNTER — HOSPITAL ENCOUNTER (EMERGENCY)
Facility: HOSPITAL | Age: 29
Discharge: HOME | End: 2024-03-30
Payer: COMMERCIAL

## 2024-03-30 VITALS
TEMPERATURE: 98.1 F | HEART RATE: 81 BPM | SYSTOLIC BLOOD PRESSURE: 126 MMHG | RESPIRATION RATE: 16 BRPM | DIASTOLIC BLOOD PRESSURE: 90 MMHG | OXYGEN SATURATION: 99 %

## 2024-03-30 DIAGNOSIS — K52.9 GASTROENTERITIS: Primary | ICD-10-CM

## 2024-03-30 LAB
ALBUMIN SERPL-MCNC: 4.5 G/DL (ref 3.5–5)
ALBUMIN/GLOB SERPL: 1.3 (ref 1.1–2.2)
ALP SERPL-CCNC: 81 U/L (ref 45–117)
ALT SERPL-CCNC: 34 U/L (ref 12–78)
ANION GAP SERPL CALC-SCNC: 4 MMOL/L (ref 5–15)
APPEARANCE UR: CLEAR
AST SERPL W P-5'-P-CCNC: 22 U/L (ref 15–37)
BACTERIA URNS QL MICRO: NEGATIVE /HPF
BASOPHILS # BLD: 0 K/UL (ref 0–0.1)
BASOPHILS NFR BLD: 1 % (ref 0–1)
BILIRUB SERPL-MCNC: 0.6 MG/DL (ref 0.2–1)
BILIRUB UR QL: NEGATIVE
BUN SERPL-MCNC: 14 MG/DL (ref 6–20)
BUN/CREAT SERPL: 12 (ref 12–20)
CA-I BLD-MCNC: 9.5 MG/DL (ref 8.5–10.1)
CHLORIDE SERPL-SCNC: 104 MMOL/L (ref 97–108)
CO2 SERPL-SCNC: 30 MMOL/L (ref 21–32)
COLOR UR: ABNORMAL
CREAT SERPL-MCNC: 1.19 MG/DL (ref 0.7–1.3)
DIFFERENTIAL METHOD BLD: NORMAL
EOSINOPHIL # BLD: 0.3 K/UL (ref 0–0.4)
EOSINOPHIL NFR BLD: 4 % (ref 0–7)
EPITH CASTS URNS QL MICRO: ABNORMAL /LPF
ERYTHROCYTE [DISTWIDTH] IN BLOOD BY AUTOMATED COUNT: 11.6 % (ref 11.5–14.5)
GLOBULIN SER CALC-MCNC: 3.4 G/DL (ref 2–4)
GLUCOSE SERPL-MCNC: 93 MG/DL (ref 65–100)
GLUCOSE UR STRIP.AUTO-MCNC: NEGATIVE MG/DL
HCT VFR BLD AUTO: 43.9 % (ref 36.6–50.3)
HGB BLD-MCNC: 15.8 G/DL (ref 12.1–17)
HGB UR QL STRIP: NEGATIVE
IMM GRANULOCYTES # BLD AUTO: 0 K/UL (ref 0–0.04)
IMM GRANULOCYTES NFR BLD AUTO: 0 % (ref 0–0.5)
KETONES UR QL STRIP.AUTO: NEGATIVE MG/DL
LEUKOCYTE ESTERASE UR QL STRIP.AUTO: NEGATIVE
LIPASE SERPL-CCNC: 38 U/L (ref 13–75)
LYMPHOCYTES # BLD: 1.6 K/UL (ref 0.8–3.5)
LYMPHOCYTES NFR BLD: 25 % (ref 12–49)
MCH RBC QN AUTO: 30.3 PG (ref 26–34)
MCHC RBC AUTO-ENTMCNC: 36 G/DL (ref 30–36.5)
MCV RBC AUTO: 84.1 FL (ref 80–99)
MONOCYTES # BLD: 0.6 K/UL (ref 0–1)
MONOCYTES NFR BLD: 10 % (ref 5–13)
MUCOUS THREADS URNS QL MICRO: ABNORMAL /LPF
NEUTS SEG # BLD: 3.7 K/UL (ref 1.8–8)
NEUTS SEG NFR BLD: 60 % (ref 32–75)
NITRITE UR QL STRIP.AUTO: NEGATIVE
NRBC # BLD: 0 K/UL (ref 0–0.01)
NRBC BLD-RTO: 0 PER 100 WBC
PH UR STRIP: 5 (ref 5–8)
PLATELET # BLD AUTO: 201 K/UL (ref 150–400)
PMV BLD AUTO: 10.5 FL (ref 8.9–12.9)
POTASSIUM SERPL-SCNC: 4.2 MMOL/L (ref 3.5–5.1)
PROT SERPL-MCNC: 7.9 G/DL (ref 6.4–8.2)
PROT UR STRIP-MCNC: NEGATIVE MG/DL
RBC # BLD AUTO: 5.22 M/UL (ref 4.1–5.7)
RBC #/AREA URNS HPF: ABNORMAL /HPF (ref 0–5)
SODIUM SERPL-SCNC: 138 MMOL/L (ref 136–145)
SP GR UR REFRACTOMETRY: 1.01 (ref 1–1.03)
UROBILINOGEN UR QL STRIP.AUTO: 0.1 EU/DL (ref 0.1–1)
WBC # BLD AUTO: 6.2 K/UL (ref 4.1–11.1)
WBC URNS QL MICRO: ABNORMAL /HPF (ref 0–4)

## 2024-03-30 PROCEDURE — 99283 EMERGENCY DEPT VISIT LOW MDM: CPT

## 2024-03-30 PROCEDURE — 36415 COLL VENOUS BLD VENIPUNCTURE: CPT

## 2024-03-30 PROCEDURE — 85025 COMPLETE CBC W/AUTO DIFF WBC: CPT

## 2024-03-30 PROCEDURE — 81003 URINALYSIS AUTO W/O SCOPE: CPT

## 2024-03-30 PROCEDURE — 80053 COMPREHEN METABOLIC PANEL: CPT

## 2024-03-30 PROCEDURE — 83690 ASSAY OF LIPASE: CPT

## 2024-03-30 ASSESSMENT — LIFESTYLE VARIABLES
HOW OFTEN DO YOU HAVE A DRINK CONTAINING ALCOHOL: NEVER
HOW MANY STANDARD DRINKS CONTAINING ALCOHOL DO YOU HAVE ON A TYPICAL DAY: PATIENT DOES NOT DRINK

## 2024-03-30 NOTE — ED PROVIDER NOTES
[facility] EMERGENCY DEPT  EMERGENCY DEPARTMENT HISTORY AND PHYSICAL EXAM      Date: 3/30/2024  Patient Name: Suman Cyr  MRN: [id number]  YOB: 1995  Date of evaluation: 3/30/2024  Provider: PIETER Echavarria   Note Started: 2:32 PM EDT 3/30/24    HISTORY OF PRESENT ILLNESS     Chief Complaint   Patient presents with    Diarrhea       History Provided By: Patient    HPI: Suman Cyr is a 29 y.o. male with no significant past medical history who presents to the ED complaining of diarrhea x 3 days. He reports that he had 3-4 episodes of diarrhea yesterday and 1 so far today. He describes it as dark green in color. He has been able to tolerate oral intake, but is starting to feel \"dry\" today. He denies any fever/chills, chest pain, shortness of breath, abdominal pain, blood in his stool, or nausea/vomiting. He denies any exposure to known sick contacts. Denies any recent travel or camping. Denies any change in his diet.     PAST MEDICAL HISTORY   Past Medical History:  Past Medical History:   Diagnosis Date    Osgood-Schlatter's disease     bilat knees       Past Surgical History:  No past surgical history on file.    Family History:  Family History   Problem Relation Age of Onset    Hypertension Maternal Grandmother     Diabetes Maternal Aunt     Hypertension Mother     Diabetes Maternal Grandmother        Social History:  Social History     Tobacco Use    Smoking status: Never    Smokeless tobacco: Never   Substance Use Topics    Alcohol use: Yes    Drug use: No       Allergies:  No Known Allergies    PCP: Ronan Camp MD    Current Meds:   No current facility-administered medications for this encounter.     No current outpatient medications on file.       Social Determinants of Health:   Social Determinants of Health     Tobacco Use: Low Risk  (4/14/2023)    Patient History     Smoking Tobacco Use: Never     Smokeless Tobacco Use: Never     Passive Exposure: Not on file   Alcohol Use: Not At Risk  Considerations: 30 y/o M presents with 3 days of diarrhea. Vital signs are stable and he is afebrile. Ddx includes viral gastroenteritis, IBS, pancreatitis, cholecystitis, appendicitis. CBC, CMP, and lipase were WNL, thus making acute abdominal pathology less likely. He has no abdominal tenderness on exam. His clinical picture is most consistent with viral gastroenteritis. Will recommend supportive treatment at home with strict return precautions. I discussed my findings and plan with the patient and he understands and agrees.     Records Reviewed (source and summary of external notes): Prior medical records and Nursing notes    Vitals:    Vitals:    03/30/24 1356   BP: (!) 142/106   Pulse: 87   Resp: 15   Temp: 98.1 °F (36.7 °C)   TempSrc: Oral   SpO2: 100%        ED COURSE  ED Course as of 03/30/24 1655   Sat Mar 30, 2024   1504 No leukocytosis. No anemia. UA negative for UTI [MM]   1521 No transaminitis. Bili is normal. Lipase is WNL.  [MM]      ED Course User Index  [MM] Anna Vrema, PA       SEPSIS Reassessment: Sepsis reassessment not applicable    Clinical Management Tools:  Not Applicable    Patient was given the following medications:  Medications - No data to display    CONSULTS: See ED Course/MDM for further details.  None     Social Determinants affecting Diagnosis/Treatment: None    Smoking Cessation: Not Applicable    PROCEDURES   Unless otherwise noted above, none.  Procedures      CRITICAL CARE TIME   Patient does not meet Critical Care Time, 0 minutes    ED IMPRESSION     1. Gastroenteritis          DISPOSITION/PLAN   DISPOSITION      Discharge Note: The patient is stable for discharge home. The signs, symptoms, diagnosis, and discharge instructions have been discussed, understanding conveyed, and agreed upon. The patient is to follow up as recommended or return to ER should their symptoms worsen.      PATIENT REFERRED TO:  No follow-up provider specified.      DISCHARGE MEDICATIONS:

## 2024-03-30 NOTE — ED TRIAGE NOTES
[facility] pt stated that he has been having ABD pain and discomfort for the past 3 days; c/o diarrhea

## 2024-03-30 NOTE — DISCHARGE INSTRUCTIONS
Thank you!  Thank you for allowing me to care for you in the emergency department. It is my goal to provide you with excellent care.  Please fill out the survey that will come to you by mail or email since we listen to your feedback!     Below you will find a list of your tests from today's visit.  Should you have any questions, please do not hesitate to call the emergency department.    Labs  Recent Results (from the past 12 hour(s))   CBC with Diff    Collection Time: 03/30/24  2:36 PM   Result Value Ref Range    WBC 6.2 4.1 - 11.1 K/uL    RBC 5.22 4.10 - 5.70 M/uL    Hemoglobin 15.8 12.1 - 17.0 g/dL    Hematocrit 43.9 36.6 - 50.3 %    MCV 84.1 80.0 - 99.0 FL    MCH 30.3 26.0 - 34.0 PG    MCHC 36.0 30.0 - 36.5 g/dL    RDW 11.6 11.5 - 14.5 %    Platelets 201 150 - 400 K/uL    MPV 10.5 8.9 - 12.9 FL    Nucleated RBCs 0.0 0.0  WBC    nRBC 0.00 0.00 - 0.01 K/uL    Neutrophils % 60 32 - 75 %    Lymphocytes % 25 12 - 49 %    Monocytes % 10 5 - 13 %    Eosinophils % 4 0 - 7 %    Basophils % 1 0 - 1 %    Immature Granulocytes 0 0 - 0.5 %    Neutrophils Absolute 3.7 1.8 - 8.0 K/UL    Lymphocytes Absolute 1.6 0.8 - 3.5 K/UL    Monocytes Absolute 0.6 0.0 - 1.0 K/UL    Eosinophils Absolute 0.3 0.0 - 0.4 K/UL    Basophils Absolute 0.0 0.0 - 0.1 K/UL    Absolute Immature Granulocyte 0.0 0.00 - 0.04 K/UL    Differential Type AUTOMATED     CMP    Collection Time: 03/30/24  2:36 PM   Result Value Ref Range    Sodium 138 136 - 145 mmol/L    Potassium 4.2 3.5 - 5.1 mmol/L    Chloride 104 97 - 108 mmol/L    CO2 30 21 - 32 mmol/L    Anion Gap 4 (L) 5 - 15 mmol/L    Glucose 93 65 - 100 mg/dL    BUN 14 6 - 20 mg/dL    Creatinine 1.19 0.70 - 1.30 mg/dL    Bun/Cre Ratio 12 12 - 20      Est, Glom Filt Rate 86 >60 ml/min/1.73m2    Calcium 9.5 8.5 - 10.1 mg/dL    Total Bilirubin 0.6 0.2 - 1.0 mg/dL    AST 22 15 - 37 U/L    ALT 34 12 - 78 U/L    Alk Phosphatase 81 45 - 117 U/L    Total Protein 7.9 6.4 - 8.2 g/dL    Albumin 4.5 3.5

## 2024-04-14 ENCOUNTER — HOSPITAL ENCOUNTER (EMERGENCY)
Facility: HOSPITAL | Age: 29
Discharge: HOME | End: 2024-04-14
Attending: EMERGENCY MEDICINE
Payer: COMMERCIAL

## 2024-04-14 VITALS
HEIGHT: 75 IN | SYSTOLIC BLOOD PRESSURE: 129 MMHG | RESPIRATION RATE: 17 BRPM | BODY MASS INDEX: 34.19 KG/M2 | HEART RATE: 69 BPM | WEIGHT: 275 LBS | DIASTOLIC BLOOD PRESSURE: 88 MMHG | TEMPERATURE: 98.5 F | OXYGEN SATURATION: 100 %

## 2024-04-14 DIAGNOSIS — K64.9 HEMORRHOIDS, UNSPECIFIED HEMORRHOID TYPE: Primary | ICD-10-CM

## 2024-04-14 PROCEDURE — 99283 EMERGENCY DEPT VISIT LOW MDM: CPT

## 2024-04-14 RX ORDER — HYDROCORTISONE 25 MG/G
CREAM TOPICAL
Qty: 28 G | Refills: 0 | Status: SHIPPED | OUTPATIENT
Start: 2024-04-14

## 2024-04-14 RX ORDER — POLYETHYLENE GLYCOL 3350 17 G/17G
17 POWDER, FOR SOLUTION ORAL DAILY PRN
Qty: 510 G | Refills: 0 | Status: SHIPPED | OUTPATIENT
Start: 2024-04-14 | End: 2024-05-14

## 2024-04-14 RX ORDER — DOCUSATE SODIUM 100 MG/1
100 CAPSULE, LIQUID FILLED ORAL 2 TIMES DAILY
Qty: 60 CAPSULE | Refills: 0 | Status: SHIPPED | OUTPATIENT
Start: 2024-04-14 | End: 2024-05-14

## 2024-04-14 ASSESSMENT — PAIN - FUNCTIONAL ASSESSMENT
PAIN_FUNCTIONAL_ASSESSMENT: NONE - DENIES PAIN
PAIN_FUNCTIONAL_ASSESSMENT: NONE - DENIES PAIN

## 2024-04-14 ASSESSMENT — LIFESTYLE VARIABLES
HOW OFTEN DO YOU HAVE A DRINK CONTAINING ALCOHOL: 2-4 TIMES A MONTH
HOW MANY STANDARD DRINKS CONTAINING ALCOHOL DO YOU HAVE ON A TYPICAL DAY: 1 OR 2

## 2024-04-14 NOTE — DISCHARGE INSTRUCTIONS
Make sure the cream you are using has hydrocortisone       Thank you!  Thank you for allowing me to care for you in the emergency department. It is my goal to provide you with excellent care.  Please fill out the survey that will come to you by mail or email since we listen to your feedback!     Below you will find a list of your tests from today's visit.  Should you have any questions, please do not hesitate to call the emergency department.    Labs  No results found for this or any previous visit (from the past 12 hour(s)).    Radiologic Studies  No orders to display     ------------------------------------------------------------------------------------------------------------  The exam and treatment you received in the Emergency Department were for an urgent problem and are not intended as complete care. It is important that you follow-up with a doctor, nurse practitioner, or physician assistant to:  (1) confirm your diagnosis,  (2) re-evaluation of changes in your illness and treatment, and (3) for ongoing care. Please take your discharge instructions with you when you go to your follow-up appointment.     If you have any problem arranging a follow-up appointment, contact the Emergency Department.  If your symptoms become worse or you do not improve as expected and you are unable to reach your health care provider, please return to the Emergency Department. We are available 24 hours a day.     If a prescription has been provided, please have it filled as soon as possible to prevent a delay in treatment. If you have any questions or reservations about taking the medication due to side effects or interactions with other medications, please call your primary care provider or contact the ER.         No

## 2024-04-14 NOTE — ED PROVIDER NOTES
[facility] EMERGENCY DEPT  EMERGENCY DEPARTMENT HISTORY AND PHYSICAL EXAM      Date: 4/14/2024  Patient Name: Suman Cyr  MRN: [id number]  Birthdate 1995  Date of evaluation: 4/14/2024  Provider: Paul Varma DO   Note Started: 8:27 AM EDT 4/14/24    HISTORY OF PRESENT ILLNESS     Chief Complaint   Patient presents with    Hemorrhoids     History Provided By: Patient    HPI: Suman Cyr is a 29 y.o. male with past medical history of Keyur slaughters disease  who presents with hemorrhoid.  He states he is having some bleeding from hemorrhoid.  Symptoms started about 4 days ago.  He is having some mild pain.  States it is from work he has to bear down a lot during his job.  He has not had any significant constipation.    PAST MEDICAL HISTORY   Past Medical History:  Past Medical History:   Diagnosis Date    Osgood-Schlatter's disease     bilat knees       Past Surgical History:  No past surgical history on file.    Family History:  Family History   Problem Relation Age of Onset    Hypertension Maternal Grandmother     Diabetes Maternal Aunt     Hypertension Mother     Diabetes Maternal Grandmother        Social History:  Social History     Tobacco Use    Smoking status: Never    Smokeless tobacco: Never   Substance Use Topics    Alcohol use: Yes    Drug use: No       Allergies:  No Known Allergies    PCP: No primary care provider on file.    Current Meds:   No current facility-administered medications for this encounter.     Current Outpatient Medications   Medication Sig Dispense Refill    docusate sodium (COLACE) 100 MG capsule Take 1 capsule by mouth 2 times daily 60 capsule 0    polyethylene glycol (GLYCOLAX) 17 GM/SCOOP powder Take 17 g by mouth daily as needed (constipation) 510 g 0    hydrocortisone (ANUSOL-HC) 2.5 % CREA rectal cream Apply to rectum as needed for hemorrhoid twice daily 28 g 0     Social Determinants of Health:   Social Determinants of Health     Tobacco Use: Low Risk  (4/14/2023)

## 2024-04-14 NOTE — ED TRIAGE NOTES
Patient is having issues with blood when he wipes. Has been using Prep H and Tucks wipes. Started yesterday.

## 2024-07-21 ENCOUNTER — HOSPITAL ENCOUNTER (EMERGENCY)
Facility: HOSPITAL | Age: 29
Discharge: HOME | End: 2024-07-21
Payer: COMMERCIAL

## 2024-07-21 VITALS
HEART RATE: 87 BPM | SYSTOLIC BLOOD PRESSURE: 141 MMHG | TEMPERATURE: 99.1 F | HEIGHT: 76 IN | BODY MASS INDEX: 32.88 KG/M2 | WEIGHT: 270 LBS | DIASTOLIC BLOOD PRESSURE: 84 MMHG | RESPIRATION RATE: 18 BRPM | OXYGEN SATURATION: 98 %

## 2024-07-21 DIAGNOSIS — U07.1 COVID-19: Primary | ICD-10-CM

## 2024-07-21 LAB
FLUAV RNA SPEC QL NAA+PROBE: NOT DETECTED
FLUBV RNA SPEC QL NAA+PROBE: NOT DETECTED
SARS-COV-2 RNA RESP QL NAA+PROBE: DETECTED

## 2024-07-21 PROCEDURE — 6370000000 HC RX 637 (ALT 250 FOR IP)

## 2024-07-21 PROCEDURE — 99283 EMERGENCY DEPT VISIT LOW MDM: CPT

## 2024-07-21 PROCEDURE — 87636 SARSCOV2 & INF A&B AMP PRB: CPT

## 2024-07-21 RX ORDER — IBUPROFEN 600 MG/1
600 TABLET ORAL
Status: COMPLETED | OUTPATIENT
Start: 2024-07-21 | End: 2024-07-21

## 2024-07-21 RX ORDER — ONDANSETRON 4 MG/1
4 TABLET, FILM COATED ORAL 3 TIMES DAILY PRN
Qty: 15 TABLET | Refills: 0 | Status: SHIPPED | OUTPATIENT
Start: 2024-07-21

## 2024-07-21 RX ORDER — DEXTROMETHORPHAN HYDROBROMIDE AND PROMETHAZINE HYDROCHLORIDE 15; 6.25 MG/5ML; MG/5ML
5 SYRUP ORAL 4 TIMES DAILY PRN
Qty: 118 ML | Refills: 0 | Status: SHIPPED | OUTPATIENT
Start: 2024-07-21 | End: 2024-07-28

## 2024-07-21 RX ORDER — LORATADINE 10 MG/1
10 TABLET ORAL DAILY
Qty: 30 TABLET | Refills: 0 | Status: SHIPPED | OUTPATIENT
Start: 2024-07-21

## 2024-07-21 RX ORDER — ONDANSETRON 4 MG/1
4 TABLET, ORALLY DISINTEGRATING ORAL ONCE
Status: COMPLETED | OUTPATIENT
Start: 2024-07-21 | End: 2024-07-21

## 2024-07-21 RX ADMIN — ONDANSETRON 4 MG: 4 TABLET, ORALLY DISINTEGRATING ORAL at 00:56

## 2024-07-21 RX ADMIN — IBUPROFEN 600 MG: 600 TABLET, FILM COATED ORAL at 00:56

## 2024-07-21 NOTE — ED PROVIDER NOTES
[facility] EMERGENCY DEPT  EMERGENCY DEPARTMENT HISTORY AND PHYSICAL EXAM      Date: 7/21/2024  Patient Name: Suman Cyr  MRN: [id number]  YOB: 1995  Date of evaluation: 7/21/2024  Provider: Myra Mccabe PA-C   Note Started: 12:39 AM EDT 7/21/24    HISTORY OF PRESENT ILLNESS     Chief Complaint   Patient presents with    flu-like symptoms       History Provided By: Patient    HPI: Suman Cyr is a 29 y.o. male with no significant past medical history, presents for evaluation of generalized fatigue, body aches, chills, nasal congestion and cough.  Patient has had sick contact and his wife and child at home with similar symptoms.  He tried taking a Mucinex earlier today without significant improvement. Denies any shortness of breath, difficulty breathing, nausea/vomiting, constipation/diarrhea, abdominal pain, rash, night sweats, or chest pain.     PAST MEDICAL HISTORY   Past Medical History:  Past Medical History:   Diagnosis Date    Osgood-Schlatter's disease     bilat knees       Past Surgical History:  History reviewed. No pertinent surgical history.    Family History:  Family History   Problem Relation Age of Onset    Hypertension Maternal Grandmother     Diabetes Maternal Aunt     Hypertension Mother     Diabetes Maternal Grandmother        Social History:  Social History     Tobacco Use    Smoking status: Never    Smokeless tobacco: Never   Substance Use Topics    Alcohol use: Yes    Drug use: No       Allergies:  No Known Allergies    PCP: No primary care provider on file.    Current Meds:   No current facility-administered medications for this encounter.     Current Outpatient Medications   Medication Sig Dispense Refill    ondansetron (ZOFRAN) 4 MG tablet Take 1 tablet by mouth 3 times daily as needed for Nausea or Vomiting 15 tablet 0    promethazine-dextromethorphan (PROMETHAZINE-DM) 6.25-15 MG/5ML syrup Take 5 mLs by mouth 4 times daily as needed for Cough 118 mL 0    loratadine (CLARITIN)

## 2024-07-21 NOTE — DISCHARGE INSTRUCTIONS
Thank you!  Thank you for allowing me to care for you in the emergency department. It is my goal to provide you with excellent care. If you have not received excellent quality care, please ask to speak to the nurse manager. Please fill out the survey that will come to you by mail or email since we listen to your feedback!     Below you will find a list of your tests from today's visit.  Should you have any questions, please do not hesitate to call the emergency department.    Labs  Recent Results (from the past 12 hour(s))   COVID-19 & Influenza Combo    Collection Time: 07/21/24 12:39 AM    Specimen: Nasopharyngeal   Result Value Ref Range    SARS-CoV-2, PCR DETECTED (A) Not Detected      Rapid Influenza A By PCR Not Detected Not Detected      Rapid Influenza B By PCR Not Detected Not Detected         Radiologic Studies  No orders to display     [unfilled]  [unfilled]  ------------------------------------------------------------------------------------------------------------  The exam and treatment you received in the Emergency Department were for an urgent problem and are not intended as complete care. It is important that you follow-up with a doctor, nurse practitioner, or physician assistant to:  (1) confirm your diagnosis,  (2) re-evaluation of changes in your illness and treatment, and  (3) for ongoing care. Please take your discharge instructions with you when you go to your follow-up appointment.     If you have any problem arranging a follow-up appointment, contact the Emergency Department.  If your symptoms become worse or you do not improve as expected and you are unable to reach your health care provider, please return to the Emergency Department. We are available 24 hours a day.     If a prescription has been provided, please have it filled as soon as possible to prevent a delay in treatment. If you have any questions or reservations about taking the medication due to side effects or interactions with other